# Patient Record
Sex: MALE | Race: WHITE | Employment: FULL TIME | URBAN - METROPOLITAN AREA
[De-identification: names, ages, dates, MRNs, and addresses within clinical notes are randomized per-mention and may not be internally consistent; named-entity substitution may affect disease eponyms.]

---

## 2021-04-19 ENCOUNTER — APPOINTMENT (OUTPATIENT)
Dept: CT IMAGING | Age: 21
End: 2021-04-19
Attending: PHYSICIAN ASSISTANT

## 2021-04-19 ENCOUNTER — HOSPITAL ENCOUNTER (EMERGENCY)
Age: 21
Discharge: HOME | End: 2021-04-19
Attending: STUDENT IN AN ORGANIZED HEALTH CARE EDUCATION/TRAINING PROGRAM

## 2021-04-19 VITALS
TEMPERATURE: 98.2 F | BODY MASS INDEX: 20.3 KG/M2 | HEIGHT: 71 IN | SYSTOLIC BLOOD PRESSURE: 148 MMHG | WEIGHT: 145 LBS | HEART RATE: 76 BPM | OXYGEN SATURATION: 100 % | DIASTOLIC BLOOD PRESSURE: 82 MMHG | RESPIRATION RATE: 18 BRPM

## 2021-04-19 DIAGNOSIS — S09.90XA MINOR HEAD INJURY, INITIAL ENCOUNTER: Primary | ICD-10-CM

## 2021-04-19 PROCEDURE — 70450 CT HEAD/BRAIN W/O DYE: CPT

## 2021-04-19 PROCEDURE — 99283 EMERGENCY DEPT VISIT LOW MDM: CPT

## 2021-04-19 NOTE — ED PROVIDER NOTES
Patient states he was at work today when a wood and glass door from a door broke, while they were cleaning the broken glass someone was holding the wooden door up but then it fell hitting him in the right side of the head, no loss of consciousness, door fell approximately foot, patient states he has a headache and blurred vision no nausea or vomiting this happened about 3 hours prior to arrival.  Coworker drove him here    The history is provided by the patient. Head Injury   The incident occurred 3 to 5 hours ago. He came to the ER via walk-in. The injury mechanism was a direct blow. The volume of blood lost was none. The quality of the pain is described as dull. The pain is at a severity of 5/10. The pain is moderate. The pain has been constant since the injury. Associated symptoms include blurred vision. Pertinent negatives include no numbness, no vomiting, no tinnitus, no disorientation, no weakness and no memory loss. He has tried nothing for the symptoms. The treatment provided mild relief. There was no loss of consciousness. He has been behaving normally. No past medical history on file. No past surgical history on file. No family history on file.     Social History     Socioeconomic History    Marital status: SINGLE     Spouse name: Not on file    Number of children: Not on file    Years of education: Not on file    Highest education level: Not on file   Occupational History    Not on file   Social Needs    Financial resource strain: Not on file    Food insecurity     Worry: Not on file     Inability: Not on file    Transportation needs     Medical: Not on file     Non-medical: Not on file   Tobacco Use    Smoking status: Not on file   Substance and Sexual Activity    Alcohol use: Not on file    Drug use: Not on file    Sexual activity: Not on file   Lifestyle    Physical activity     Days per week: Not on file     Minutes per session: Not on file    Stress: Not on file Relationships    Social connections     Talks on phone: Not on file     Gets together: Not on file     Attends Anabaptist service: Not on file     Active member of club or organization: Not on file     Attends meetings of clubs or organizations: Not on file     Relationship status: Not on file    Intimate partner violence     Fear of current or ex partner: Not on file     Emotionally abused: Not on file     Physically abused: Not on file     Forced sexual activity: Not on file   Other Topics Concern    Not on file   Social History Narrative    Not on file         ALLERGIES: Albuterol    Review of Systems   HENT: Negative for tinnitus. Eyes: Positive for blurred vision. Gastrointestinal: Negative for vomiting. Neurological: Negative for weakness and numbness. Psychiatric/Behavioral: Negative for memory loss. All other systems reviewed and are negative. Vitals:    04/19/21 1745   BP: (!) 153/96   Pulse: 80   Resp: 20   Temp: 98.2 °F (36.8 °C)   SpO2: 99%   Weight: 65.8 kg (145 lb)   Height: 5' 11\" (1.803 m)            Physical Exam  Vitals signs and nursing note reviewed. Constitutional:       General: He is not in acute distress. Appearance: Normal appearance. He is well-developed and normal weight. He is not diaphoretic. HENT:      Head: Normocephalic. Comments: Patient points to right parietal scalp area as being tender, patient has very short hair, no swelling no abrasions or obvious hematoma appreciated  Eyes:      Extraocular Movements: Extraocular movements intact. Pupils: Pupils are equal, round, and reactive to light. Neck:      Musculoskeletal: Normal range of motion and neck supple. No neck rigidity or muscular tenderness. Cardiovascular:      Rate and Rhythm: Normal rate and regular rhythm. Pulmonary:      Effort: Pulmonary effort is normal.      Breath sounds: Normal breath sounds.    Abdominal:      General: Bowel sounds are normal.      Palpations: Abdomen is soft.   Musculoskeletal: Normal range of motion. Skin:     General: Skin is warm. Neurological:      General: No focal deficit present. Mental Status: He is alert and oriented to person, place, and time. Comments: Patient is alert and oriented memory is intact speech is clear, gait is normal, fine motor skills intact   Psychiatric:         Mood and Affect: Mood normal.         Behavior: Behavior normal.          MDM  Number of Diagnoses or Management Options  Diagnosis management comments: Head CT shows no acute abnormality.   Patient with slight concussive symptoms patient use Tylenol for pain see primary care physician for routine recheck       Amount and/or Complexity of Data Reviewed  Tests in the radiology section of CPT®: ordered and reviewed  Review and summarize past medical records: yes    Risk of Complications, Morbidity, and/or Mortality  Presenting problems: low  Diagnostic procedures: low  Management options: low    Patient Progress  Patient progress: improved         Procedures

## 2021-04-19 NOTE — ED TRIAGE NOTES
Pt reports that he was working when a glass door was dropped on his head while at work. Denies LOC, but states the he is seeing stars.

## 2021-04-19 NOTE — DISCHARGE INSTRUCTIONS
Use Tylenol for pain, see your primary care physician for routine recheck.   Return to ER if worsening symptoms i.e. vomiting for no other reason or strokelike symptoms, avoid excessive screen time for the next week

## 2021-04-19 NOTE — ED NOTES
I have reviewed discharge instructions with the patient. The patient verbalized understanding. Patient left ED via Discharge Method: ambulatory to Home with (insert name of family/friend, self, transport SELF). Opportunity for questions and clarification provided. Patient given 0 scripts. To continue your aftercare when you leave the hospital, you may receive an automated call from our care team to check in on how you are doing.  This is a free service and part of our promise to provide the best care and service to meet your aftercare needs. \" If you have questions, or wish to unsubscribe from this service please call [phone].  Thank you for Choosing our [facility] Emergency Department.